# Patient Record
Sex: MALE | Race: WHITE | HISPANIC OR LATINO | Employment: FULL TIME | URBAN - METROPOLITAN AREA
[De-identification: names, ages, dates, MRNs, and addresses within clinical notes are randomized per-mention and may not be internally consistent; named-entity substitution may affect disease eponyms.]

---

## 2018-05-03 ENCOUNTER — HOSPITAL ENCOUNTER (OUTPATIENT)
Dept: RADIOLOGY | Facility: MEDICAL CENTER | Age: 23
End: 2018-05-03
Attending: EMERGENCY MEDICINE
Payer: COMMERCIAL

## 2018-05-03 ENCOUNTER — OFFICE VISIT (OUTPATIENT)
Dept: URGENT CARE | Facility: PHYSICIAN GROUP | Age: 23
End: 2018-05-03
Payer: COMMERCIAL

## 2018-05-03 VITALS
BODY MASS INDEX: 45.47 KG/M2 | HEART RATE: 74 BPM | DIASTOLIC BLOOD PRESSURE: 72 MMHG | RESPIRATION RATE: 18 BRPM | TEMPERATURE: 97.6 F | SYSTOLIC BLOOD PRESSURE: 120 MMHG | OXYGEN SATURATION: 98 % | HEIGHT: 69 IN | WEIGHT: 307 LBS

## 2018-05-03 DIAGNOSIS — Q02 MICROCEPHALY (HCC): ICD-10-CM

## 2018-05-03 DIAGNOSIS — Z86.69 HISTORY OF HYDROCEPHALUS: ICD-10-CM

## 2018-05-03 DIAGNOSIS — R51.9 ACUTE NONINTRACTABLE HEADACHE, UNSPECIFIED HEADACHE TYPE: ICD-10-CM

## 2018-05-03 DIAGNOSIS — G43.009 MIGRAINE WITHOUT AURA AND WITHOUT STATUS MIGRAINOSUS, NOT INTRACTABLE: ICD-10-CM

## 2018-05-03 PROCEDURE — 99203 OFFICE O/P NEW LOW 30 MIN: CPT | Performed by: EMERGENCY MEDICINE

## 2018-05-03 PROCEDURE — 70450 CT HEAD/BRAIN W/O DYE: CPT

## 2018-05-03 ASSESSMENT — ENCOUNTER SYMPTOMS
VISUAL CHANGE: 0
DIZZINESS: 0
COUGH: 0
NECK PAIN: 0
DIAPHORESIS: 0
LOSS OF BALANCE: 0
CHILLS: 0
FALLS: 0
EYE PAIN: 0
PHOTOPHOBIA: 0
TINGLING: 0
NUMBNESS: 0
FEVER: 0
FOCAL WEAKNESS: 0
BLURRED VISION: 0
ABDOMINAL PAIN: 0
SENSORY CHANGE: 0
SINUS PAIN: 0
VOMITING: 0
NAUSEA: 0
SINUS PRESSURE: 0
HEADACHES: 1
SORE THROAT: 0
RHINORRHEA: 0
SPEECH CHANGE: 0

## 2018-05-03 NOTE — LETTER
May 3, 2018         Patient: Lamberto Godinez   YOB: 1995   Date of Visit: 5/3/2018           To Whom it May Concern:    Lamberto Godinez was seen in my clinic on 5/3/2018. He is excused from work 05/03/2018.    If you have any questions or concerns, please don't hesitate to call.        Sincerely,           Braden Gill M.D.  Electronically Signed

## 2018-05-03 NOTE — PROGRESS NOTES
Subjective:      Lamberto Godinez is a 23 y.o. male who presents with Headache (x2days )            Headache    This is a recurrent problem. The current episode started yesterday. The problem occurs constantly. The problem has been unchanged. The pain is located in the left unilateral and vertex region. The pain does not radiate. The pain quality is not similar to prior headaches. Pertinent negatives include no abdominal pain, blurred vision, coughing, dizziness, ear pain, eye pain, fever, hearing loss, loss of balance, nausea, neck pain, numbness, phonophobia, photophobia, rhinorrhea, sinus pressure, sore throat, tingling, visual change or vomiting. Nothing aggravates the symptoms. He has tried NSAIDs for the symptoms. The treatment provided no relief. His past medical history is significant for obesity. There is no history of migraines in the family, recent head traumas or sinus disease.   Onset of headache over one week ago; appeared to improve, re-worsened yesterday. Father notes history of hydrocephalus as a child; no specific treatment.    Review of Systems   Constitutional: Negative for chills, diaphoresis and fever.   HENT: Positive for congestion. Negative for ear pain, hearing loss, nosebleeds, rhinorrhea, sinus pain, sinus pressure and sore throat.    Eyes: Negative for blurred vision, photophobia and pain.   Respiratory: Negative for cough.    Gastrointestinal: Negative for abdominal pain, nausea and vomiting.   Musculoskeletal: Negative for falls and neck pain.   Skin: Negative for rash.   Neurological: Positive for headaches. Negative for dizziness, tingling, sensory change, speech change, focal weakness, numbness and loss of balance.   Endo/Heme/Allergies: Negative for environmental allergies.     PMH:  has no past medical history of Allergy; Anemia; Anxiety; Arthritis; ASTHMA; Blood transfusion; Cancer (HCC); CATARACT; CHF (congestive heart failure) (HCC); Clotting disorder (HCC); COPD; Depression;  "EMPHYSEMA; GERD (gastroesophageal reflux disease); Glaucoma; Heart attack (HCC); Heart murmur; HIV (human immunodeficiency virus infection); Kidney disease; Meningitis; Muscle disorder; OSTEOPOROSIS; Seizure (HCC); Sickle cell disease (HCC); Stroke (HCC); Substance abuse; Thyroid disease; Tuberculosis; or Ulcer.  MEDS: No current outpatient prescriptions on file.  ALLERGIES: No Known Allergies  SURGHX: History reviewed. No pertinent surgical history.  SOCHX:  reports that he has never smoked. He has never used smokeless tobacco. He reports that he does not drink alcohol or use drugs.  FH: family history includes Cancer in his maternal grandmother; Diabetes in his paternal grandfather; Hypertension in his father.       Objective:     /72   Pulse 74   Temp 36.4 °C (97.6 °F)   Resp 18   Ht 1.74 m (5' 8.5\")   Wt (!) 139.3 kg (307 lb)   SpO2 98%   BMI 46.00 kg/m²      Physical Exam   Constitutional: He is oriented to person, place, and time. He is cooperative.  Non-toxic appearance. He does not appear ill. No distress.   HENT:   Head: Atraumatic. Microcephalic.   Right Ear: Tympanic membrane and ear canal normal.   Left Ear: Tympanic membrane and ear canal normal.   Nose: No mucosal edema or rhinorrhea. Right sinus exhibits no maxillary sinus tenderness and no frontal sinus tenderness. Left sinus exhibits no maxillary sinus tenderness and no frontal sinus tenderness.   Mouth/Throat: Uvula is midline. No oral lesions. No trismus in the jaw. No dental caries. No oropharyngeal exudate, posterior oropharyngeal edema or posterior oropharyngeal erythema.   No temporomandibular joint tenderness or crepitus.   Eyes: Conjunctivae, EOM and lids are normal. Pupils are equal, round, and reactive to light.   Neck: Trachea normal and phonation normal. Neck supple.   Cardiovascular: Normal rate, regular rhythm and normal heart sounds.    Pulmonary/Chest: Effort normal and breath sounds normal.   Lymphadenopathy:     He has " no cervical adenopathy.   Neurological: He is alert and oriented to person, place, and time. He has normal strength. He displays no tremor. No cranial nerve deficit or sensory deficit. He displays a negative Romberg sign. Coordination normal.   Skin: Skin is warm and dry. No rash noted.   Psychiatric: He has a normal mood and affect.          No fever noted; no meningeal signs or focal neurological deficit.  Advised patient and father by phone of the CT report, advised treatment plan   Assessment/Plan:     1. Migraine without aura and without status migrainosus, not intractable   mg with food prn  F/U PCP if recurrent    2. Acute nonintractable headache, unspecified headache type  - CT-HEAD W/O; per radiologist:  1. No evidence of acute cerebral infarction, hemorrhage or mass lesion.  2.  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    3. Microcephaly (HCC)    4. History of hydrocephalus

## 2018-08-28 ENCOUNTER — OFFICE VISIT (OUTPATIENT)
Dept: MEDICAL GROUP | Facility: PHYSICIAN GROUP | Age: 23
End: 2018-08-28
Payer: COMMERCIAL

## 2018-08-28 ENCOUNTER — HOSPITAL ENCOUNTER (OUTPATIENT)
Dept: LAB | Facility: MEDICAL CENTER | Age: 23
End: 2018-08-28
Attending: NURSE PRACTITIONER
Payer: COMMERCIAL

## 2018-08-28 VITALS
RESPIRATION RATE: 16 BRPM | WEIGHT: 314 LBS | HEIGHT: 70 IN | HEART RATE: 87 BPM | BODY MASS INDEX: 44.95 KG/M2 | TEMPERATURE: 97.5 F | DIASTOLIC BLOOD PRESSURE: 80 MMHG | OXYGEN SATURATION: 97 % | SYSTOLIC BLOOD PRESSURE: 104 MMHG

## 2018-08-28 DIAGNOSIS — E66.01 MORBID OBESITY WITH BMI OF 45.0-49.9, ADULT (HCC): ICD-10-CM

## 2018-08-28 DIAGNOSIS — Z86.69 HISTORY OF HYDROCEPHALUS AS A CHILD: ICD-10-CM

## 2018-08-28 DIAGNOSIS — R60.0 LOCALIZED EDEMA: ICD-10-CM

## 2018-08-28 DIAGNOSIS — Q02 MICROCEPHALY (HCC): ICD-10-CM

## 2018-08-28 DIAGNOSIS — Z86.69 HX OF MIGRAINES: ICD-10-CM

## 2018-08-28 LAB
25(OH)D3 SERPL-MCNC: 13 NG/ML (ref 30–100)
ALBUMIN SERPL BCP-MCNC: 3.8 G/DL (ref 3.2–4.9)
ALBUMIN/GLOB SERPL: 1.2 G/DL
ALP SERPL-CCNC: 82 U/L (ref 30–99)
ALT SERPL-CCNC: 111 U/L (ref 2–50)
ANION GAP SERPL CALC-SCNC: 10 MMOL/L (ref 0–11.9)
APPEARANCE UR: CLEAR
AST SERPL-CCNC: 51 U/L (ref 12–45)
BACTERIA #/AREA URNS HPF: ABNORMAL /HPF
BASOPHILS # BLD AUTO: 0.3 % (ref 0–1.8)
BASOPHILS # BLD: 0.02 K/UL (ref 0–0.12)
BILIRUB SERPL-MCNC: 0.6 MG/DL (ref 0.1–1.5)
BILIRUB UR QL STRIP.AUTO: NEGATIVE
BUN SERPL-MCNC: 12 MG/DL (ref 8–22)
CALCIUM SERPL-MCNC: 9.3 MG/DL (ref 8.5–10.5)
CHLORIDE SERPL-SCNC: 104 MMOL/L (ref 96–112)
CHOLEST SERPL-MCNC: 107 MG/DL (ref 100–199)
CO2 SERPL-SCNC: 26 MMOL/L (ref 20–33)
COLOR UR: YELLOW
CREAT SERPL-MCNC: 0.59 MG/DL (ref 0.5–1.4)
EOSINOPHIL # BLD AUTO: 0.07 K/UL (ref 0–0.51)
EOSINOPHIL NFR BLD: 0.9 % (ref 0–6.9)
EPI CELLS #/AREA URNS HPF: ABNORMAL /HPF
ERYTHROCYTE [DISTWIDTH] IN BLOOD BY AUTOMATED COUNT: 39.8 FL (ref 35.9–50)
EST. AVERAGE GLUCOSE BLD GHB EST-MCNC: 108 MG/DL
GLOBULIN SER CALC-MCNC: 3.2 G/DL (ref 1.9–3.5)
GLUCOSE SERPL-MCNC: 97 MG/DL (ref 65–99)
GLUCOSE UR STRIP.AUTO-MCNC: NEGATIVE MG/DL
HBA1C MFR BLD: 5.4 % (ref 0–5.6)
HCT VFR BLD AUTO: 45.4 % (ref 42–52)
HDLC SERPL-MCNC: 41 MG/DL
HGB BLD-MCNC: 15.2 G/DL (ref 14–18)
HYALINE CASTS #/AREA URNS LPF: ABNORMAL /LPF
IMM GRANULOCYTES # BLD AUTO: 0.02 K/UL (ref 0–0.11)
IMM GRANULOCYTES NFR BLD AUTO: 0.3 % (ref 0–0.9)
KETONES UR STRIP.AUTO-MCNC: NEGATIVE MG/DL
LDLC SERPL CALC-MCNC: 47 MG/DL
LEUKOCYTE ESTERASE UR QL STRIP.AUTO: ABNORMAL
LYMPHOCYTES # BLD AUTO: 2.53 K/UL (ref 1–4.8)
LYMPHOCYTES NFR BLD: 32.3 % (ref 22–41)
MCH RBC QN AUTO: 28.6 PG (ref 27–33)
MCHC RBC AUTO-ENTMCNC: 33.5 G/DL (ref 33.7–35.3)
MCV RBC AUTO: 85.3 FL (ref 81.4–97.8)
MICRO URNS: ABNORMAL
MONOCYTES # BLD AUTO: 0.48 K/UL (ref 0–0.85)
MONOCYTES NFR BLD AUTO: 6.1 % (ref 0–13.4)
NEUTROPHILS # BLD AUTO: 4.72 K/UL (ref 1.82–7.42)
NEUTROPHILS NFR BLD: 60.1 % (ref 44–72)
NITRITE UR QL STRIP.AUTO: NEGATIVE
NRBC # BLD AUTO: 0 K/UL
NRBC BLD-RTO: 0 /100 WBC
PH UR STRIP.AUTO: 6.5 [PH]
PLATELET # BLD AUTO: 249 K/UL (ref 164–446)
PMV BLD AUTO: 10.5 FL (ref 9–12.9)
POTASSIUM SERPL-SCNC: 4 MMOL/L (ref 3.6–5.5)
PROT SERPL-MCNC: 7 G/DL (ref 6–8.2)
PROT UR QL STRIP: NEGATIVE MG/DL
RBC # BLD AUTO: 5.32 M/UL (ref 4.7–6.1)
RBC # URNS HPF: ABNORMAL /HPF
RBC UR QL AUTO: NEGATIVE
SODIUM SERPL-SCNC: 140 MMOL/L (ref 135–145)
SP GR UR STRIP.AUTO: 1.02
TRIGL SERPL-MCNC: 96 MG/DL (ref 0–149)
TSH SERPL DL<=0.005 MIU/L-ACNC: 2.52 UIU/ML (ref 0.38–5.33)
UROBILINOGEN UR STRIP.AUTO-MCNC: 1 MG/DL
WBC # BLD AUTO: 7.8 K/UL (ref 4.8–10.8)
WBC #/AREA URNS HPF: ABNORMAL /HPF

## 2018-08-28 PROCEDURE — 81001 URINALYSIS AUTO W/SCOPE: CPT

## 2018-08-28 PROCEDURE — 85025 COMPLETE CBC W/AUTO DIFF WBC: CPT

## 2018-08-28 PROCEDURE — 36415 COLL VENOUS BLD VENIPUNCTURE: CPT

## 2018-08-28 PROCEDURE — 80061 LIPID PANEL: CPT

## 2018-08-28 PROCEDURE — 83036 HEMOGLOBIN GLYCOSYLATED A1C: CPT

## 2018-08-28 PROCEDURE — 82306 VITAMIN D 25 HYDROXY: CPT

## 2018-08-28 PROCEDURE — 84443 ASSAY THYROID STIM HORMONE: CPT

## 2018-08-28 PROCEDURE — 99214 OFFICE O/P EST MOD 30 MIN: CPT | Performed by: NURSE PRACTITIONER

## 2018-08-28 PROCEDURE — 80053 COMPREHEN METABOLIC PANEL: CPT

## 2018-08-28 ASSESSMENT — PATIENT HEALTH QUESTIONNAIRE - PHQ9: CLINICAL INTERPRETATION OF PHQ2 SCORE: 0

## 2018-08-28 NOTE — ASSESSMENT & PLAN NOTE
Father has reported that patient has gained a lot of weight in the past year.  Father reports that he is only eating one meal a day. Not eating a lot of fruits /vegetables. Not getting regular exercise.

## 2018-08-28 NOTE — PROGRESS NOTES
Lamberto Godinez is a 23 y.o. male here today to establish care and for evaluation and management of:    HPI:  Father is accompanying patient.   Microcephaly (HCC)  Born with microcephaly.  In NICU for 3 months.     Hx of migraines  This has resolved.  No further headaches    Localized edema  Over the last year father has noticed edema in lower legs.  Non-painful,  It has not really worsened.  No chest pain reported.  Reports drinking plenty of water.     Morbid obesity with BMI of 45.0-49.9, adult (HCC)  Father has reported that patient has gained a lot of weight in the past year.  Father reports that he is only eating one meal a day. Not eating a lot of fruits /vegetables. Not getting regular exercise.      History of hydrocephalus as a child  Father reports patient was born prematurely with ensuing hydrocephalus.  In nicu 3 months.  No shunt placed.  No follow up with neuro reported.       Current medicines (including changes today)  No current outpatient prescriptions on file.     No current facility-administered medications for this visit.        He  has a past medical history of Prematurity. He also has no past medical history of Allergy; Anemia; Anxiety; Arthritis; ASTHMA; Blood transfusion; Cancer (HCC); CATARACT; CHF (congestive heart failure) (HCC); Clotting disorder (HCC); COPD; Depression; EMPHYSEMA; GERD (gastroesophageal reflux disease); Glaucoma; Heart attack (HCC); Heart murmur; HIV (human immunodeficiency virus infection); Kidney disease; Meningitis; Muscle disorder; OSTEOPOROSIS; Seizure (HCC); Sickle cell disease (HCC); Stroke (HCC); Substance abuse; Thyroid disease; Tuberculosis; or Ulcer.    He  has no past surgical history on file.    Social History   Substance Use Topics   • Smoking status: Never Smoker   • Smokeless tobacco: Never Used   • Alcohol use No      Comment: rare       Social History     Social History Narrative   • No narrative on file       Family History   Problem Relation Age of  "Onset   • Hypertension Mother    • Diabetes Mother    • Hypertension Father    • Cancer Maternal Grandmother    • Diabetes Paternal Grandfather        Family Status   Relation Status   • Mo Alive   • Fa Alive   • MGMo    • MGFa Alive   • PGMo Alive   • PGFa Alive         ROS  As stated in hpi.  Has possible mental limitations.   All other systems reviewed and are negative     Objective:     Blood pressure 104/80, pulse 87, temperature 36.4 °C (97.5 °F), resp. rate 16, height 1.765 m (5' 9.5\"), weight (!) 142.4 kg (314 lb), SpO2 97 %. Body mass index is 45.7 kg/m².  Physical Exam:    Constitutional: Alert, no distress.  Skin: Warm, dry, good turgor, no rashes in visible areas.  Eye: Equal, round and reactive, conjunctiva clear, lids normal.  ENMT: Lips without lesions, good dentition, oropharynx clear.  Neck: Trachea midline, no masses, no thyromegaly. No cervical or supraclavicular lymphadenopathy.  Respiratory: Unlabored respiratory effort, lungs clear to auscultation, no wheezes, no ronchi.  Cardiovascular: Normal S1, S2, no murmur, no edema.  Abdomen: Soft, morbid obesity,  non-tender, no masses, no hepatosplenomegaly.  Psych: Alert and oriented x3, normal affect and mood.  MSK:  Bilateral edema noted, non-pitting.      Assessment and Plan:   The following treatment plan was discussed    1. Microcephaly (HCC)  This is a new problem to me.  Chronic, ongoing. Stable.  Is not followed by neurology.  Is working full time at a [employer].     2. History of hydrocephalus as a child  This is a new problem to me.  Chronic, historical.  Stable at this time.  Some possible mental limitations    3. Hx of migraines  This is a new problem to me.  Historical issue that has resolved at this time.     4. Morbid obesity with BMI of 45.0-49.9, adult (HCC)  This is a new problem to me.  Chronic, ongoing, unstable.  BMI 45.7.  Discussed increasing movement by walking his dog daily or walking with his sister, add more " vegetables to diet and remember to eat something for breakfast.  Labs ordered, monitor and follow results.   - Patient identified as having weight management issue.  Appropriate orders and counseling given.  - LIPID PROFILE; Future  - TSH; Future  - VITAMIN D,25 HYDROXY; Future  - URINALYSIS,CULTURE IF INDICATED; Future    5. Localized edema  This is a new problem to me.  Chronic, ongoing.  This most likely represents his weight gain/morbid obesity as it is not pitting edema.  No increased shortness of breath reported.  Will order labs to rule out metabolic issues. Monitor and follow.   - CBC WITH DIFFERENTIAL; Future  - COMP METABOLIC PANEL; Future  - HEMOGLOBIN A1C; Future  - URINALYSIS,CULTURE IF INDICATED; Future      Records requested.  Followup: Return in about 3 months (around 11/28/2018) for weight management.         Educated in proper administration of medication(s) ordered today including safety, possible SE, risks, benefits, rationale and alternatives to therapy.     Please note that this dictation was created using voice recognition software. I have made every reasonable attempt to correct obvious errors, but I expect that there are errors of grammar and possibly content that I did not discover before finalizing the note.

## 2018-08-28 NOTE — ASSESSMENT & PLAN NOTE
Over the last year father has noticed edema in lower legs.  Non-painful,  It has not really worsened.  No chest pain reported.  Reports drinking plenty of water.

## 2018-08-28 NOTE — ASSESSMENT & PLAN NOTE
Father reports patient was born prematurely with ensuing hydrocephalus.  In nicu 3 months.  No shunt placed.  No follow up with neuro reported.

## 2018-08-29 ENCOUNTER — TELEPHONE (OUTPATIENT)
Dept: MEDICAL GROUP | Facility: PHYSICIAN GROUP | Age: 23
End: 2018-08-29

## 2018-08-29 RX ORDER — CIPROFLOXACIN 500 MG/1
500 TABLET, FILM COATED ORAL 2 TIMES DAILY
Qty: 10 TAB | Refills: 0 | Status: SHIPPED | OUTPATIENT
Start: 2018-08-29

## 2018-08-29 NOTE — TELEPHONE ENCOUNTER
----- Message from AQUILINO Dumont sent at 8/29/2018  7:03 AM PDT -----  Please let patient know that his lab work is back.  The test for diabetes was negative.  Thyroid function normal.  No anemia.  Vitamin D level is low and I would recommend that he add 2000 units vitamin D daily.  Kidney function is good.  There is a slight elevation in the liver function tests and I would like to repeat in 3 months when you come back in november to see if it is still elevated.  If it is, then we will obtain an ultrasound.  The urinalysis returned with some white blood cells and bacteria that would indicate an infection.  I will send over an antibiotic to take for 5 days to your pharmacy.  Please increase water while you are taking this medication.

## 2019-12-03 ENCOUNTER — HOSPITAL ENCOUNTER (OUTPATIENT)
Dept: RADIOLOGY | Facility: MEDICAL CENTER | Age: 24
End: 2019-12-03
Attending: NURSE PRACTITIONER
Payer: COMMERCIAL

## 2019-12-03 ENCOUNTER — OFFICE VISIT (OUTPATIENT)
Dept: MEDICAL GROUP | Facility: PHYSICIAN GROUP | Age: 24
End: 2019-12-03
Payer: COMMERCIAL

## 2019-12-03 ENCOUNTER — TELEPHONE (OUTPATIENT)
Dept: MEDICAL GROUP | Facility: PHYSICIAN GROUP | Age: 24
End: 2019-12-03

## 2019-12-03 VITALS
RESPIRATION RATE: 16 BRPM | BODY MASS INDEX: 40.94 KG/M2 | DIASTOLIC BLOOD PRESSURE: 80 MMHG | HEART RATE: 105 BPM | SYSTOLIC BLOOD PRESSURE: 126 MMHG | OXYGEN SATURATION: 99 % | HEIGHT: 70 IN | TEMPERATURE: 97.2 F | WEIGHT: 286 LBS

## 2019-12-03 DIAGNOSIS — M79.642 HAND PAIN, LEFT: ICD-10-CM

## 2019-12-03 DIAGNOSIS — M54.50 ACUTE MIDLINE LOW BACK PAIN WITHOUT SCIATICA: ICD-10-CM

## 2019-12-03 DIAGNOSIS — S22.000A COMPRESSION FRACTURE OF BODY OF THORACIC VERTEBRA (HCC): ICD-10-CM

## 2019-12-03 DIAGNOSIS — Z23 NEED FOR VACCINATION: ICD-10-CM

## 2019-12-03 PROCEDURE — 90471 IMMUNIZATION ADMIN: CPT | Performed by: NURSE PRACTITIONER

## 2019-12-03 PROCEDURE — 72100 X-RAY EXAM L-S SPINE 2/3 VWS: CPT

## 2019-12-03 PROCEDURE — 73120 X-RAY EXAM OF HAND: CPT | Mod: LT

## 2019-12-03 PROCEDURE — 99214 OFFICE O/P EST MOD 30 MIN: CPT | Mod: 25 | Performed by: NURSE PRACTITIONER

## 2019-12-03 PROCEDURE — 90686 IIV4 VACC NO PRSV 0.5 ML IM: CPT | Performed by: NURSE PRACTITIONER

## 2019-12-03 ASSESSMENT — PATIENT HEALTH QUESTIONNAIRE - PHQ9: CLINICAL INTERPRETATION OF PHQ2 SCORE: 0

## 2019-12-03 NOTE — LETTER
December 3, 2019         Lamberto Godinez  [address] [facility] [address]        Dear Lamberto:      Below are the results from your recent visit:    The hand xray did not show any fracture.  The xray of the back did show a compression fracture at T12-L1.  This is your lower back that compressed when you fell and caused a fracture.  I would like you to see a spine specialist to follow the healing process.  The xray also showed that your bone density was thin and this could have contributed to the fracture.  I will send in the referral for you to follow up with a specialist.   AQUILINO Dumont     Resulted Orders   DX-LUMBAR SPINE-2 OR 3 VIEWS    Narrative    12/3/2019 11:31 AM    HISTORY/REASON FOR EXAM:  Lumbar spine injury during fall      TECHNIQUE/ EXAM DESCRIPTION AND NUMBER OF VIEWS:  3 views of the lumbar spine.    COMPARISON: None.    FINDINGS:  Vertebral body height is well maintained.  There is no evidence of fracture.  There is moderate wedge-shaped superior endplate compression fracture of the L1 vertebral body. There is diffuse osteopenia. Possible mild compression fractures affecting superior endplate T12 and L2 are also noted.  There is no evidence of degenerative disk disease.        Impression    1.  Moderate superior endplate wedge compression fracture of the L1 vertebral body is identified.    2.  Possible mild compression fracture superior endplate L2 and T12.     If you have any questions or concerns, please don't hesitate to call.    Electronically Signed

## 2019-12-03 NOTE — ASSESSMENT & PLAN NOTE
Fell yesterday while walking dog.  Landed on buttocks.  Reports pain in lower back.  No numbness or tingling reported.  Using advil and tylenol, heat.

## 2019-12-03 NOTE — ASSESSMENT & PLAN NOTE
Fell while walking dog and hurt left 4th finger with leash that was around hand.  Bruised and swollen.  Fell yesterday.  Taking ibuprofen/tylenol

## 2019-12-03 NOTE — PROGRESS NOTES
"Chief Complaint   Patient presents with   • Fall   • Finger Injury   • Low Back Pain       Subjective:   Lamberto Godinez is a 24 y.o. male here today for evaluation and management of two acute issues    Hand pain, left  Fell while walking dog and hurt left 4th finger with leash that was around hand.  Bruised and swollen.  Fell yesterday.  Taking ibuprofen/tylenol    Acute midline low back pain without sciatica  Fell yesterday while walking dog.  Landed on buttocks.  Reports pain in lower back.  No numbness or tingling reported.  Using advil and tylenol, heat.           Current medicines (including changes today)  Current Outpatient Medications   Medication Sig Dispense Refill   • ciprofloxacin (CIPRO) 500 MG Tab Take 1 Tab by mouth 2 times a day. 10 Tab 0     No current facility-administered medications for this visit.      He  has a past medical history of Prematurity. He also has no past medical history of Allergy, Anemia, Anxiety, Arthritis, ASTHMA, Blood transfusion, Cancer (HCC), CATARACT, CHF (congestive heart failure) ([facility]), Clotting disorder (HCC), COPD, Depression, EMPHYSEMA, GERD (gastroesophageal reflux disease), Glaucoma, Heart attack ([facility]), Heart murmur, HIV (human immunodeficiency virus infection), Kidney disease, Meningitis, Muscle disorder, OSTEOPOROSIS, Seizure (HCC), Sickle cell disease (HCC), Stroke (HCC), Substance abuse, Thyroid disease, Tuberculosis, or Ulcer.    ROS as stated in hpi  No chest pain, no shortness of breath, no abdominal pain       Objective:     /80 (BP Location: Left arm, Patient Position: Sitting)   Pulse (!) 105   Temp 36.2 °C (97.2 °F) (Temporal)   Resp 16   Ht 1.765 m (5' 9.5\")   Wt (!) 129.7 kg (286 lb)   SpO2 99%  Body mass index is 41.63 kg/m².   Physical Exam:  Constitutional: Alert, no distress.  Skin: Warm, dry, good turgor,no cyanosis, no rashes in visible areas.  Eye: Equal, round and reactive, conjunctiva clear, lids normal.  Ears: No tenderness, no " discharge.  External canals are without any significant edema or erythema. .  Gross auditory acuity is intact.  Nose: symmetrical without tenderness, no discharge.  Mouth/Throat: lips without lesion.  Oropharynx clear.  Neck: Trachea midline, no masses, no obvious thyroid enlargement..Range of motion within normal limits.  Neuro: Cranial nerves 2-12 grossly intact.  No sensory deficit.  Respiratory: Unlabored respiratory effort,  MSK:  Mild pain to palpation over lower lumbar spine, no sciatica.  DTR's WNL.  Left hand pain with bruising noted on under side of left finger, swelling noted at base of left 4th finger some tenderness over metacarpals.    Psych: Alert and oriented x3, normal affect and mood and judgement.        Assessment and Plan:   The following treatment plan was discussed    1. Need for vaccination  Due for flu vaccine.  No acute illness  I have placed the below orders and discussed them with an approved delegating provider.  The MA is performing the below orders under the direction of Dr. Alessandro MD.    - Influenza Vaccine Quad Injection (PF)    2. Hand pain, left  This is a new problem to me.  Acute injury from fall yesterday.  Xray ordered to rule out fracture.  Encouraged ice, elevation, advil.  Monitor and follow.   - DX-HAND 2- LEFT; Future    3. Acute midline low back pain without sciatica  This is a new problem to me.  Acute new pain from fall while walking dog.  Suspect lumbar strain.  Xray ordered to rule out fracture.  Ice, rest, advil. Monitor and follow.   - DX-LUMBAR SPINE-2 OR 3 VIEWS; Future      Followup: Return if symptoms worsen or fail to improve.         Educated in proper administration of medication(s) ordered today including safety, possible SE, risks, benefits, rationale and alternatives to therapy.     Please note that this dictation was created using voice recognition software. I have made every reasonable attempt to correct obvious errors, but I expect that there are  errors of grammar and possibly content that I did not discover before finalizing the note.

## 2019-12-04 NOTE — TELEPHONE ENCOUNTER
----- Message from AQUILINO Dumont sent at 12/3/2019  4:14 PM PST -----  Lamberto (message to his dad's phone, please)  The hand xray did not show any fracture.  The xray of the back did show a compression fracture at T12-L1.  This is your lower back that compressed when you fell and caused a fracture.  I would like you to see a spine specialist to follow the healing process.  The xray also showed that your bone density was thin and this could have contributed to the fracture.  I will send in the referral for you to follow up with a specialist.  ,AQUILINO Dumont

## 2019-12-04 NOTE — TELEPHONE ENCOUNTER
Phone Number Called: [phone]      Call outcome: left message for dad to call back     Message:

## 2019-12-05 NOTE — TELEPHONE ENCOUNTER
Phone Number Called: [phone] (home)       Call outcome: left message for Dad to call back     Message:

## 2019-12-10 NOTE — TELEPHONE ENCOUNTER
Phone Number Called: [phone]      Call outcome: left message informing Dad I will mail a letter as I have been unale to reeach them.     Message:

## 2024-05-01 ENCOUNTER — OFFICE VISIT (OUTPATIENT)
Dept: URGENT CARE | Facility: PHYSICIAN GROUP | Age: 29
End: 2024-05-01
Payer: COMMERCIAL

## 2024-05-01 VITALS
OXYGEN SATURATION: 95 % | TEMPERATURE: 100.1 F | WEIGHT: 291 LBS | SYSTOLIC BLOOD PRESSURE: 114 MMHG | BODY MASS INDEX: 43.1 KG/M2 | HEIGHT: 69 IN | RESPIRATION RATE: 20 BRPM | DIASTOLIC BLOOD PRESSURE: 68 MMHG | HEART RATE: 135 BPM

## 2024-05-01 DIAGNOSIS — J06.9 URI WITH COUGH AND CONGESTION: ICD-10-CM

## 2024-05-01 DIAGNOSIS — J02.9 SORE THROAT: ICD-10-CM

## 2024-05-01 LAB
FLUAV RNA SPEC QL NAA+PROBE: NEGATIVE
FLUBV RNA SPEC QL NAA+PROBE: NEGATIVE
RSV RNA SPEC QL NAA+PROBE: NEGATIVE
S PYO DNA SPEC NAA+PROBE: NOT DETECTED
SARS-COV-2 RNA RESP QL NAA+PROBE: NEGATIVE

## 2024-05-01 PROCEDURE — 0241U POCT CEPHEID COV-2, FLU A/B, RSV - PCR: CPT | Performed by: STUDENT IN AN ORGANIZED HEALTH CARE EDUCATION/TRAINING PROGRAM

## 2024-05-01 PROCEDURE — 3074F SYST BP LT 130 MM HG: CPT | Performed by: STUDENT IN AN ORGANIZED HEALTH CARE EDUCATION/TRAINING PROGRAM

## 2024-05-01 PROCEDURE — 99213 OFFICE O/P EST LOW 20 MIN: CPT | Performed by: STUDENT IN AN ORGANIZED HEALTH CARE EDUCATION/TRAINING PROGRAM

## 2024-05-01 PROCEDURE — 3078F DIAST BP <80 MM HG: CPT | Performed by: STUDENT IN AN ORGANIZED HEALTH CARE EDUCATION/TRAINING PROGRAM

## 2024-05-01 PROCEDURE — 87651 STREP A DNA AMP PROBE: CPT | Performed by: STUDENT IN AN ORGANIZED HEALTH CARE EDUCATION/TRAINING PROGRAM

## 2024-05-01 NOTE — PROGRESS NOTES
"Manish Godinez is a 29 y.o. male who presents with Pharyngitis (Chills, runny nose/X this AM )            Pharyngitis   This is a new problem. The current episode started today. Associated symptoms include congestion and coughing. Pertinent negatives include no abdominal pain, diarrhea, ear pain, shortness of breath or vomiting.       Review of Systems   Constitutional:  Positive for chills, fever and malaise/fatigue.   HENT:  Positive for congestion and sore throat. Negative for ear pain.    Respiratory:  Positive for cough. Negative for shortness of breath and wheezing.    Cardiovascular:  Negative for chest pain and palpitations.   Gastrointestinal:  Negative for abdominal pain, constipation, diarrhea, nausea and vomiting.   Musculoskeletal:  Negative for myalgias.   All other systems reviewed and are negative.             Objective     /68   Pulse (!) 135   Temp 37.8 °C (100.1 °F) (Temporal)   Resp (!) 24   Ht 1.753 m (5' 9\")   Wt (!) 132 kg (291 lb)   SpO2 95%   BMI 42.97 kg/m²      Physical Exam  Vitals reviewed.   Constitutional:       General: He is not in acute distress.     Appearance: Normal appearance. He is not toxic-appearing.   HENT:      Head: Normocephalic and atraumatic.      Right Ear: Tympanic membrane, ear canal and external ear normal.      Left Ear: Tympanic membrane, ear canal and external ear normal.      Nose: Congestion present.      Mouth/Throat:      Lips: Pink.      Mouth: Mucous membranes are moist.      Pharynx: Oropharynx is clear. Uvula midline.   Eyes:      Extraocular Movements: Extraocular movements intact.      Conjunctiva/sclera: Conjunctivae normal.      Pupils: Pupils are equal, round, and reactive to light.   Cardiovascular:      Rate and Rhythm: Regular rhythm. Tachycardia present.   Pulmonary:      Effort: Pulmonary effort is normal.      Breath sounds: Normal breath sounds.   Skin:     General: Skin is warm.   Neurological:      General: No focal " deficit present.      Mental Status: He is alert.                             Assessment & Plan        1. URI with cough and congestion  - POCT CoV-2, Flu A/B, RSV by PCR    2. Sore throat  - POCT CEPHEID GROUP A STREP - PCR       Differential diagnoses, supportive care measures (rest, hydration, OTC Tylenol/ibuprofen, nasal saline rinses, humidified air, throat lozenges) and indications for immediate follow-up discussed with patient. Pathogenesis of diagnosis discussed including typical length and natural progression.      Instructed to return to urgent care or nearest emergency department if symptoms fail to improve, for any change in condition, further concerns, or new concerning symptoms.    Patient states understanding and agrees with the plan of care and discharge instructions.

## 2024-05-12 ASSESSMENT — ENCOUNTER SYMPTOMS
SHORTNESS OF BREATH: 0
SORE THROAT: 1
FEVER: 1
CONSTIPATION: 0
WHEEZING: 0
COUGH: 1
VOMITING: 0
MYALGIAS: 0
NAUSEA: 0
PALPITATIONS: 0
ABDOMINAL PAIN: 0
DIARRHEA: 0
CHILLS: 1

## 2025-07-10 ENCOUNTER — OFFICE VISIT (OUTPATIENT)
Dept: MEDICAL GROUP | Facility: PHYSICIAN GROUP | Age: 30
End: 2025-07-10
Payer: COMMERCIAL

## 2025-07-10 ENCOUNTER — HOSPITAL ENCOUNTER (OUTPATIENT)
Dept: LAB | Facility: MEDICAL CENTER | Age: 30
End: 2025-07-10
Payer: COMMERCIAL

## 2025-07-10 VITALS
WEIGHT: 274.9 LBS | OXYGEN SATURATION: 98 % | BODY MASS INDEX: 39.35 KG/M2 | DIASTOLIC BLOOD PRESSURE: 82 MMHG | RESPIRATION RATE: 18 BRPM | SYSTOLIC BLOOD PRESSURE: 126 MMHG | TEMPERATURE: 96.8 F | HEART RATE: 84 BPM | HEIGHT: 70 IN

## 2025-07-10 DIAGNOSIS — E66.9 OBESITY (BMI 30-39.9): ICD-10-CM

## 2025-07-10 DIAGNOSIS — Z13.9 ENCOUNTER FOR SCREENING: ICD-10-CM

## 2025-07-10 DIAGNOSIS — R79.9 ABNORMAL BLOOD CHEMISTRY: ICD-10-CM

## 2025-07-10 DIAGNOSIS — Q02 MICROCEPHALY (HCC): ICD-10-CM

## 2025-07-10 DIAGNOSIS — E66.9 OBESITY (BMI 30-39.9): Primary | ICD-10-CM

## 2025-07-10 PROBLEM — S22.000A COMPRESSION FRACTURE OF BODY OF THORACIC VERTEBRA (HCC): Status: RESOLVED | Noted: 2019-12-03 | Resolved: 2025-07-10

## 2025-07-10 PROBLEM — R60.0 LOCALIZED EDEMA: Status: RESOLVED | Noted: 2018-08-28 | Resolved: 2025-07-10

## 2025-07-10 LAB
25(OH)D3 SERPL-MCNC: 9 NG/ML (ref 30–100)
ALBUMIN SERPL BCP-MCNC: 4.1 G/DL (ref 3.2–4.9)
ALBUMIN/GLOB SERPL: 1.2 G/DL
ALP SERPL-CCNC: 86 U/L (ref 30–99)
ALT SERPL-CCNC: 27 U/L (ref 2–50)
ANION GAP SERPL CALC-SCNC: 13 MMOL/L (ref 7–16)
AST SERPL-CCNC: 26 U/L (ref 12–45)
BASOPHILS # BLD AUTO: 0.3 % (ref 0–1.8)
BASOPHILS # BLD: 0.03 K/UL (ref 0–0.12)
BILIRUB SERPL-MCNC: 0.5 MG/DL (ref 0.1–1.5)
BUN SERPL-MCNC: 10 MG/DL (ref 8–22)
CALCIUM ALBUM COR SERPL-MCNC: 9.3 MG/DL (ref 8.5–10.5)
CALCIUM SERPL-MCNC: 9.4 MG/DL (ref 8.5–10.5)
CHLORIDE SERPL-SCNC: 103 MMOL/L (ref 96–112)
CHOLEST SERPL-MCNC: 149 MG/DL (ref 100–199)
CO2 SERPL-SCNC: 21 MMOL/L (ref 20–33)
CREAT SERPL-MCNC: 0.67 MG/DL (ref 0.5–1.4)
EOSINOPHIL # BLD AUTO: 0.11 K/UL (ref 0–0.51)
EOSINOPHIL NFR BLD: 1.2 % (ref 0–6.9)
ERYTHROCYTE [DISTWIDTH] IN BLOOD BY AUTOMATED COUNT: 39.1 FL (ref 35.9–50)
EST. AVERAGE GLUCOSE BLD GHB EST-MCNC: 100 MG/DL
GFR SERPLBLD CREATININE-BSD FMLA CKD-EPI: 128 ML/MIN/1.73 M 2
GLOBULIN SER CALC-MCNC: 3.3 G/DL (ref 1.9–3.5)
GLUCOSE SERPL-MCNC: 91 MG/DL (ref 65–99)
HBA1C MFR BLD: 5.1 % (ref 4–5.6)
HCT VFR BLD AUTO: 46.5 % (ref 42–52)
HCV AB SER QL: NORMAL
HDLC SERPL-MCNC: 40 MG/DL
HGB BLD-MCNC: 15.8 G/DL (ref 14–18)
HIV 1+2 AB+HIV1 P24 AG SERPL QL IA: NORMAL
IMM GRANULOCYTES # BLD AUTO: 0.02 K/UL (ref 0–0.11)
IMM GRANULOCYTES NFR BLD AUTO: 0.2 % (ref 0–0.9)
LDLC SERPL CALC-MCNC: 70 MG/DL
LYMPHOCYTES # BLD AUTO: 3.29 K/UL (ref 1–4.8)
LYMPHOCYTES NFR BLD: 37 % (ref 22–41)
MCH RBC QN AUTO: 28.8 PG (ref 27–33)
MCHC RBC AUTO-ENTMCNC: 34 G/DL (ref 32.3–36.5)
MCV RBC AUTO: 84.7 FL (ref 81.4–97.8)
MONOCYTES # BLD AUTO: 0.49 K/UL (ref 0–0.85)
MONOCYTES NFR BLD AUTO: 5.5 % (ref 0–13.4)
NEUTROPHILS # BLD AUTO: 4.94 K/UL (ref 1.82–7.42)
NEUTROPHILS NFR BLD: 55.8 % (ref 44–72)
NRBC # BLD AUTO: 0 K/UL
NRBC BLD-RTO: 0 /100 WBC (ref 0–0.2)
PLATELET # BLD AUTO: 259 K/UL (ref 164–446)
PMV BLD AUTO: 10.2 FL (ref 9–12.9)
POTASSIUM SERPL-SCNC: 3.8 MMOL/L (ref 3.6–5.5)
PROT SERPL-MCNC: 7.4 G/DL (ref 6–8.2)
RBC # BLD AUTO: 5.49 M/UL (ref 4.7–6.1)
SODIUM SERPL-SCNC: 137 MMOL/L (ref 135–145)
TRIGL SERPL-MCNC: 196 MG/DL (ref 0–149)
TSH SERPL DL<=0.005 MIU/L-ACNC: 3.4 UIU/ML (ref 0.38–5.33)
WBC # BLD AUTO: 8.9 K/UL (ref 4.8–10.8)

## 2025-07-10 PROCEDURE — 85025 COMPLETE CBC W/AUTO DIFF WBC: CPT

## 2025-07-10 PROCEDURE — 82306 VITAMIN D 25 HYDROXY: CPT

## 2025-07-10 PROCEDURE — 80053 COMPREHEN METABOLIC PANEL: CPT

## 2025-07-10 PROCEDURE — 86803 HEPATITIS C AB TEST: CPT

## 2025-07-10 PROCEDURE — 80061 LIPID PANEL: CPT

## 2025-07-10 PROCEDURE — 99204 OFFICE O/P NEW MOD 45 MIN: CPT

## 2025-07-10 PROCEDURE — 87389 HIV-1 AG W/HIV-1&-2 AB AG IA: CPT

## 2025-07-10 PROCEDURE — 83036 HEMOGLOBIN GLYCOSYLATED A1C: CPT

## 2025-07-10 PROCEDURE — 84443 ASSAY THYROID STIM HORMONE: CPT

## 2025-07-10 PROCEDURE — 36415 COLL VENOUS BLD VENIPUNCTURE: CPT

## 2025-07-10 ASSESSMENT — PATIENT HEALTH QUESTIONNAIRE - PHQ9: CLINICAL INTERPRETATION OF PHQ2 SCORE: 0

## 2025-07-10 NOTE — PROGRESS NOTES
Verbal consent was acquired by the patient to use [employer] ambient listening note generation during this visit     Subjective:     HPI:   History of Present Illness  The patient presents for an establishment visit.    Leg Swelling  He reports experiencing leg swelling, which he attributes to fluid accumulation. The swelling is not associated with pain. His father has observed no recent changes in the size of his legs. He does not experience shortness of breath or chest pain and maintains an active lifestyle with regular walking.  - Onset: Not specified.  - Location: Legs.  - Character: Swelling attributed to fluid accumulation, not associated with pain.  - Alleviating/Aggravating Factors: No recent changes in size observed by his father.  - Severity: No shortness of breath or chest pain; maintains an active lifestyle.    History of Migraines  He has a history of migraines but no longer experiences severe headaches.  - Onset: Not specified.  - Character: History of migraines.  - Severity: No longer experiences severe headaches.    Weight Loss  He has lost weight, which he believes is due to increased physical activity.  - Onset: Not specified.  - Character: Weight loss.  - Alleviating/Aggravating Factors: Believes it is due to increased physical activity.    Skin Changes  He has noticed some skin tags and darkening of the skin, which occasionally itch.  - Onset: Not specified.  - Character: Skin tags and darkening of the skin, occasionally itchy.    He was born prematurely and diagnosed with hydrocephalus, a condition characterized by excess fluid in the brain. He has no known chronic health conditions such as asthma or diabetes. This is his second visit to this clinic, and he is currently under the care of a primary care physician. He has never received the COVID-19 vaccine. His immunization record is from [place]. He has 2 sisters who are in good health. He has no history of surgeries. He is exposed to  "secondhand smoke from his father. He only drinks water and does not drink tea. His diet consists of home-cooked meals, primarily chicken, and he avoids beef and other meats. He also limits his intake of bread, tortillas, hamburger buns, rice, and other carbohydrate-rich foods.    SOCIAL HISTORY  He does not use tobacco or alcohol but is exposed to secondhand smoke from his father.    FAMILY HISTORY  His mother has prediabetes and high blood pressure. His maternal grandmother had cancer. His paternal grandmother  of a tumor in her stomach.    Health Maintenance: Completed    Objective:     Exam:  /82   Pulse 84   Temp 36 °C (96.8 °F) (Temporal)   Resp 18   Ht 1.778 m (5' 10\")   Wt 125 kg (274 lb 14.4 oz)   SpO2 98%   BMI 39.44 kg/m²  Body mass index is 39.44 kg/m².    Physical Exam  Constitutional:       Appearance: Normal appearance. He is obese.   HENT:      Head: Atraumatic. Microcephalic.      Right Ear: Tympanic membrane and ear canal normal. There is no impacted cerumen.      Left Ear: Tympanic membrane and ear canal normal. There is no impacted cerumen.      Mouth/Throat:      Mouth: Mucous membranes are moist.      Pharynx: Oropharynx is clear.   Eyes:      Conjunctiva/sclera: Conjunctivae normal.      Pupils: Pupils are equal, round, and reactive to light.   Cardiovascular:      Rate and Rhythm: Normal rate and regular rhythm.      Heart sounds: No murmur heard.  Pulmonary:      Effort: Pulmonary effort is normal. No respiratory distress.      Breath sounds: Normal breath sounds. No wheezing.   Abdominal:      General: There is no distension.      Tenderness: There is no abdominal tenderness.   Musculoskeletal:         General: No swelling. Normal range of motion.      Cervical back: Normal range of motion.      Right lower leg: No edema.      Left lower leg: No edema.   Skin:     General: Skin is warm and dry.      Capillary Refill: Capillary refill takes less than 2 seconds.      Findings: " No rash.   Neurological:      General: No focal deficit present.      Mental Status: He is alert and oriented to person, place, and time.      Deep Tendon Reflexes: Reflexes normal.   Psychiatric:         Mood and Affect: Mood normal.             Results      Assessment & Plan:     1. Obesity (BMI 30-39.9)  Lipid Profile    HEMOGLOBIN A1C      2. Microcephaly (HCC)        3. Abnormal blood chemistry  CBC WITH DIFFERENTIAL    Comp Metabolic Panel    VITAMIN D,25 HYDROXY (DEFICIENCY)    TSH WITH REFLEX TO FT4      4. Encounter for screening  HIV AG/AB COMBO ASSAY SCREENING    HEP C VIRUS ANTIBODY          Assessment & Plan  1. Leg swelling: Stable.  - The swelling in the legs appears to be due to adipose tissue or excess skin rather than fluid accumulation.  - Given the active lifestyle and lack of pain, this is not a cause for concern at present.  - Monitor for any changes in color or onset of pain in the affected area.  - Continued weight loss may improve the condition.    2. Migraines: No current issues.  - History of migraines but no recent episodes of severe headaches.  - No medication or treatment required at this time.  - Monitor for any recurrence of symptoms.    3. Health maintenance.  - Order comprehensive routine blood work including complete blood count, complete metabolic panel, cholesterol panel, diabetes screen, vitamin D, thyroid, HIV, and hepatitis C screening.  - Fast for 8 hours prior to the blood work.  - Discuss the importance of these screenings for overall health.  - Review results in the follow-up appointment.    4. Weight loss.  - Successfully lost between 30 to 50 pounds, reducing BMI from 45 to 39.  - Continue current exercise regimen.  - Consider adopting a Mediterranean diet for sustainable weight loss.  - Recommend over-the-counter dietary supplements.    5. Skin irritation.  - Apply over-the-counter hydrocortisone cream once or twice daily for 2 weeks.  - Maintain cleanliness of the  area.  - Monitor for improvement or worsening of symptoms.  - Further evaluation if symptoms persist.    Follow-up  - A follow-up appointment is scheduled for 1 month from now to review blood work results and overall progress.    Return in about 1 month (around 8/10/2025) for f/u labs.    Please note that this dictation was created using voice recognition software. I have made every reasonable attempt to correct obvious errors, but I expect that there are errors of grammar and possibly content that I did not discover before finalizing the note.